# Patient Record
Sex: FEMALE | Race: WHITE | URBAN - METROPOLITAN AREA
[De-identification: names, ages, dates, MRNs, and addresses within clinical notes are randomized per-mention and may not be internally consistent; named-entity substitution may affect disease eponyms.]

---

## 2020-01-31 ENCOUNTER — TELEPHONE (OUTPATIENT)
Dept: FAMILY MEDICINE CLINIC | Facility: CLINIC | Age: 50
End: 2020-01-31

## 2020-01-31 ENCOUNTER — OFFICE VISIT (OUTPATIENT)
Dept: FAMILY MEDICINE CLINIC | Facility: CLINIC | Age: 50
End: 2020-01-31
Payer: COMMERCIAL

## 2020-01-31 VITALS
WEIGHT: 202 LBS | HEART RATE: 80 BPM | DIASTOLIC BLOOD PRESSURE: 78 MMHG | HEIGHT: 62 IN | BODY MASS INDEX: 37.17 KG/M2 | RESPIRATION RATE: 16 BRPM | OXYGEN SATURATION: 99 % | SYSTOLIC BLOOD PRESSURE: 122 MMHG | TEMPERATURE: 99 F

## 2020-01-31 DIAGNOSIS — M06.9 RHEUMATOID ARTHRITIS, INVOLVING UNSPECIFIED SITE, UNSPECIFIED RHEUMATOID FACTOR PRESENCE: ICD-10-CM

## 2020-01-31 DIAGNOSIS — E55.9 VITAMIN D DEFICIENCY: ICD-10-CM

## 2020-01-31 DIAGNOSIS — Z12.11 SCREENING FOR COLON CANCER: ICD-10-CM

## 2020-01-31 DIAGNOSIS — Z83.3 FAMILY HISTORY OF DIABETES MELLITUS TYPE II: ICD-10-CM

## 2020-01-31 DIAGNOSIS — L30.9 ECZEMA, UNSPECIFIED TYPE: ICD-10-CM

## 2020-01-31 DIAGNOSIS — I10 ESSENTIAL HYPERTENSION: Primary | ICD-10-CM

## 2020-01-31 DIAGNOSIS — Z12.39 SCREENING FOR BREAST CANCER: ICD-10-CM

## 2020-01-31 PROCEDURE — 99204 OFFICE O/P NEW MOD 45 MIN: CPT | Performed by: NURSE PRACTITIONER

## 2020-01-31 RX ORDER — LISINOPRIL AND HYDROCHLOROTHIAZIDE 25; 20 MG/1; MG/1
1 TABLET ORAL DAILY
Qty: 90 TABLET | Refills: 0 | Status: SHIPPED | OUTPATIENT
Start: 2020-01-31 | End: 2020-03-27

## 2020-01-31 RX ORDER — INDOMETHACIN 50 MG/1
CAPSULE ORAL
COMMUNITY
Start: 2019-12-30

## 2020-01-31 RX ORDER — LISINOPRIL AND HYDROCHLOROTHIAZIDE 25; 20 MG/1; MG/1
TABLET ORAL
COMMUNITY
Start: 2020-01-02 | End: 2020-01-31

## 2020-01-31 RX ORDER — CHOLECALCIFEROL (VITAMIN D3) 50 MCG
TABLET ORAL
COMMUNITY

## 2020-01-31 NOTE — TELEPHONE ENCOUNTER
Patient signed medical records request form in office to receive records from [facility]. Request form faxed to [phone] & [phone] on 1/31/2020. Request form also sent to scanning to be scanned to patient chart.

## 2020-01-31 NOTE — PROGRESS NOTES
Jhonatan Pelaez is a 47year old female. HPI:   Patient presents today as a new patient to establish care. Patient also here for medication check and follow-up on her hypertension.   Patient reports she has been taking her lisinopril hydrochlorothiazide for drinks per week    Drug use: Not on file       REVIEW OF SYSTEMS:   GENERAL HEALTH: feels well otherwise  SKIN: SEE HPI  RESPIRATORY: denies shortness of breath with exertion  CARDIOVASCULAR: denies chest pain on exertion  GI: denies abdominal pain and den Take 1 tablet by mouth daily. Imaging & Consults:  GASTRO - INTERNAL  ELAINE TORY 2D+3D SCREENING BILAT (CPT=77067/11207)    Follow Up with:  No follow-up provider specified. 1. Essential hypertension  Continue Lisinopril-HCTZ as ordered.   Monitor fo

## 2020-03-03 ENCOUNTER — PATIENT OUTREACH (OUTPATIENT)
Dept: FAMILY MEDICINE CLINIC | Facility: CLINIC | Age: 50
End: 2020-03-03

## 2020-03-03 NOTE — PROGRESS NOTES
Patient due for annual physical and mammogram- mammogram ordered- please remind patient to schedule/complete and schedule physical--thanks

## 2020-03-10 ENCOUNTER — MED REC SCAN ONLY (OUTPATIENT)
Dept: FAMILY MEDICINE CLINIC | Facility: CLINIC | Age: 50
End: 2020-03-10

## 2020-03-26 DIAGNOSIS — I10 ESSENTIAL HYPERTENSION: ICD-10-CM

## 2020-03-27 RX ORDER — LISINOPRIL AND HYDROCHLOROTHIAZIDE 25; 20 MG/1; MG/1
1 TABLET ORAL DAILY
Qty: 90 TABLET | Refills: 0 | Status: SHIPPED | OUTPATIENT
Start: 2020-03-27 | End: 2020-06-01

## 2020-05-31 DIAGNOSIS — Z83.3 FAMILY HISTORY OF DIABETES MELLITUS TYPE II: Primary | ICD-10-CM

## 2020-05-31 DIAGNOSIS — I10 ESSENTIAL HYPERTENSION: ICD-10-CM

## 2020-05-31 DIAGNOSIS — E55.9 VITAMIN D DEFICIENCY: ICD-10-CM

## 2020-06-01 RX ORDER — LISINOPRIL AND HYDROCHLOROTHIAZIDE 25; 20 MG/1; MG/1
1 TABLET ORAL DAILY
Qty: 90 TABLET | Refills: 0 | Status: SHIPPED | OUTPATIENT
Start: 2020-06-01 | End: 2020-08-27

## 2020-08-24 ENCOUNTER — TELEPHONE (OUTPATIENT)
Dept: FAMILY MEDICINE CLINIC | Facility: CLINIC | Age: 50
End: 2020-08-24

## 2020-08-24 NOTE — TELEPHONE ENCOUNTER
Please call pt to schedule hypertension medication follow up with Allegra Figueroa. LOV: 1/31/2020    Thank you.

## 2020-08-25 DIAGNOSIS — I10 ESSENTIAL HYPERTENSION: ICD-10-CM

## 2020-08-27 RX ORDER — LISINOPRIL AND HYDROCHLOROTHIAZIDE 25; 20 MG/1; MG/1
1 TABLET ORAL DAILY
Qty: 90 TABLET | Refills: 0 | Status: SHIPPED | OUTPATIENT
Start: 2020-08-27

## 2020-08-28 ENCOUNTER — TELEPHONE (OUTPATIENT)
Dept: FAMILY MEDICINE CLINIC | Facility: CLINIC | Age: 50
End: 2020-08-28

## 2020-08-28 DIAGNOSIS — E55.9 VITAMIN D DEFICIENCY: Primary | ICD-10-CM

## 2020-08-28 RX ORDER — CHOLECALCIFEROL (VITAMIN D3) 125 MCG
1 CAPSULE ORAL DAILY
Qty: 30 CAPSULE | Refills: 0 | COMMUNITY
Start: 2020-08-28 | End: 2020-09-01 | Stop reason: DRUGHIGH

## 2020-08-28 NOTE — TELEPHONE ENCOUNTER
Reported   Hx Parathyroid thyrod removed     She used to take 2000 IU but been off (not sure how long)     Advised to start 5000 IU  Repeat labs ordered     [facility]

## 2020-08-28 NOTE — TELEPHONE ENCOUNTER
Received patient's labs from [address] [place] [address] are stable with exception of Vitamin D--low at 27. Is she taking OTC Vitamin D 2,000 iu daily? If yes- increase to 5,000 iu daily.  Repeat Vitamin D in 3 months dx: Vitamin D deficiency

## 2020-09-01 NOTE — TELEPHONE ENCOUNTER
LVM for pt to take 2000 units of Vitamin D daily since she has been off it it per [employer]. Vit D order and previous lab results mailed to pt.

## 2020-09-10 ENCOUNTER — MED REC SCAN ONLY (OUTPATIENT)
Dept: FAMILY MEDICINE CLINIC | Facility: CLINIC | Age: 50
End: 2020-09-10

## 2020-09-11 ENCOUNTER — TELEPHONE (OUTPATIENT)
Dept: FAMILY MEDICINE CLINIC | Facility: CLINIC | Age: 50
End: 2020-09-11

## 2020-09-11 NOTE — TELEPHONE ENCOUNTER
Work form received for [employer]. Completed by Trice Figueroa. Faxed. Copy sent to scan. Original mailed to pt.

## 2020-09-22 ENCOUNTER — TELEPHONE (OUTPATIENT)
Dept: FAMILY MEDICINE CLINIC | Facility: CLINIC | Age: 50
End: 2020-09-22

## 2020-09-22 NOTE — TELEPHONE ENCOUNTER
Spoke to patient. Yesterday started with a tingling numbness and comes and goes -at first in top lip, then in bottom lip. Today it feels like its in R.eye as well. I asked her to look in mirror and smile, she reports a minimal droopiness on R.side.  I expla

## 2020-09-22 NOTE — TELEPHONE ENCOUNTER
1. What are your symptoms?  r side facial numbness       2. How long have you been having these symptoms? yesterday    3. Have you done anything already to treat your symptoms?    no      ADDITIONAL INFO:

## 2025-06-25 NOTE — TELEPHONE ENCOUNTER
Has appt aug. No labs done yet. Can we refill? Has labs from [facility] for quest but I think they are only good x 6 mos? I have pended another panel if you agree.
Patient has appt on 8/21/20
Please call pt and remind pt to have labs completed at [facility] (fasting) and schedule med check/follow up appt in June --Thanks
[facility] [place]